# Patient Record
Sex: FEMALE | Race: WHITE
[De-identification: names, ages, dates, MRNs, and addresses within clinical notes are randomized per-mention and may not be internally consistent; named-entity substitution may affect disease eponyms.]

---

## 2019-08-01 ENCOUNTER — HOSPITAL ENCOUNTER (OUTPATIENT)
Dept: HOSPITAL 95 - ORSCMMR | Age: 69
Discharge: HOME | End: 2019-08-01
Attending: INTERNAL MEDICINE
Payer: COMMERCIAL

## 2019-08-01 VITALS — WEIGHT: 182.1 LBS | HEIGHT: 55 IN

## 2019-08-01 DIAGNOSIS — K64.8: ICD-10-CM

## 2019-08-01 DIAGNOSIS — I10: ICD-10-CM

## 2019-08-01 DIAGNOSIS — Z79.899: ICD-10-CM

## 2019-08-01 DIAGNOSIS — E78.5: ICD-10-CM

## 2019-08-01 DIAGNOSIS — Z12.11: Primary | ICD-10-CM

## 2019-08-01 DIAGNOSIS — K52.9: ICD-10-CM

## 2019-08-01 DIAGNOSIS — F17.210: ICD-10-CM

## 2019-08-01 DIAGNOSIS — E66.9: ICD-10-CM

## 2019-08-01 DIAGNOSIS — K57.30: ICD-10-CM

## 2019-08-01 DIAGNOSIS — K62.1: ICD-10-CM

## 2019-08-01 PROCEDURE — 0DBN8ZX EXCISION OF SIGMOID COLON, VIA NATURAL OR ARTIFICIAL OPENING ENDOSCOPIC, DIAGNOSTIC: ICD-10-PCS | Performed by: INTERNAL MEDICINE

## 2019-08-01 PROCEDURE — 0DBP8ZX EXCISION OF RECTUM, VIA NATURAL OR ARTIFICIAL OPENING ENDOSCOPIC, DIAGNOSTIC: ICD-10-PCS | Performed by: INTERNAL MEDICINE

## 2019-08-01 NOTE — NUR
Ambulatory in Day Surgery
History, Chart, Medications and Allergies reviewed before start of
procedure.Lungs clear T/O to Auscultation.
Patient States Post-Procedure ride home has been arranged.
Patient confirms NPO status and agrees with scheduled surgery.

## 2019-08-01 NOTE — NUR
08/01/19 1240 Alise Adames
History, Chart, Medications and Allergies reviewed before start of
procedure.PATIENT DETERMINED TO BE ASA APPROPRIATE FOR PROPOFOL
SEDATION PRIOR TO START OF PROCEDURE BY .MONITOR INTACT
WITH CONTINUOUS PULSE OXIMETRY AND INTERMITTENT BP.3-LEAD EKG REVIEWED
WITH PHYSICIAN PRIOR TO START OF PROCEDURE.O2 VIA N/C INTACT
THROUGHOUT SEDATION/PROCEDURE.

## 2023-10-30 ENCOUNTER — HOSPITAL ENCOUNTER (OUTPATIENT)
Dept: HOSPITAL 95 - LAB SHORT | Age: 73
End: 2023-10-30
Attending: DERMATOLOGY
Payer: COMMERCIAL

## 2023-10-30 DIAGNOSIS — D48.5: Primary | ICD-10-CM
